# Patient Record
Sex: MALE | Race: WHITE | NOT HISPANIC OR LATINO | Employment: UNEMPLOYED | URBAN - METROPOLITAN AREA
[De-identification: names, ages, dates, MRNs, and addresses within clinical notes are randomized per-mention and may not be internally consistent; named-entity substitution may affect disease eponyms.]

---

## 2021-01-30 ENCOUNTER — HOSPITAL ENCOUNTER (EMERGENCY)
Facility: MEDICAL CENTER | Age: 31
End: 2021-01-30
Attending: EMERGENCY MEDICINE | Admitting: EMERGENCY MEDICINE
Payer: MEDICAID

## 2021-01-30 VITALS
WEIGHT: 167.99 LBS | BODY MASS INDEX: 22.75 KG/M2 | TEMPERATURE: 98.7 F | SYSTOLIC BLOOD PRESSURE: 142 MMHG | RESPIRATION RATE: 18 BRPM | HEIGHT: 72 IN | HEART RATE: 89 BPM | OXYGEN SATURATION: 96 % | DIASTOLIC BLOOD PRESSURE: 96 MMHG

## 2021-01-30 DIAGNOSIS — L08.9 STAPH SKIN INFECTION: ICD-10-CM

## 2021-01-30 DIAGNOSIS — B95.8 STAPH SKIN INFECTION: ICD-10-CM

## 2021-01-30 PROCEDURE — 700102 HCHG RX REV CODE 250 W/ 637 OVERRIDE(OP): Performed by: EMERGENCY MEDICINE

## 2021-01-30 PROCEDURE — A9270 NON-COVERED ITEM OR SERVICE: HCPCS | Performed by: EMERGENCY MEDICINE

## 2021-01-30 PROCEDURE — 99283 EMERGENCY DEPT VISIT LOW MDM: CPT

## 2021-01-30 RX ORDER — SULFAMETHOXAZOLE AND TRIMETHOPRIM 800; 160 MG/1; MG/1
2 TABLET ORAL ONCE
Status: COMPLETED | OUTPATIENT
Start: 2021-01-30 | End: 2021-01-30

## 2021-01-30 RX ORDER — SULFAMETHOXAZOLE AND TRIMETHOPRIM 800; 160 MG/1; MG/1
1 TABLET ORAL 2 TIMES DAILY
Qty: 14 TAB | Refills: 0 | Status: SHIPPED | OUTPATIENT
Start: 2021-01-30 | End: 2021-02-06

## 2021-01-30 RX ADMIN — SULFAMETHOXAZOLE AND TRIMETHOPRIM 2 TABLET: 800; 160 TABLET ORAL at 21:55

## 2021-01-30 ASSESSMENT — PAIN DESCRIPTION - PAIN TYPE: TYPE: ACUTE PAIN

## 2021-01-31 NOTE — ED PROVIDER NOTES
ED Provider Note    ED Provider Note    Scribed for Elena Montanez MD by Elena Montanez M.D.. 1/30/2021, 9:47 PM.    Primary care provider: Pcp Pt States None  Means of arrival: Private  History obtained from: Patient  History limited by: None    CHIEF COMPLAINT  Chief Complaint   Patient presents with   • Wound Infection     multiple wound infections  started about 2-3 weeks ago   has tried to take care of them however not working        HPI  Rudy Watson is a 30 y.o. male who presents to the Emergency Department for evaluation for skin lesions.  Patient tells me for approximate last 2 to 3 weeks he has noticed lesions isolated to the lower abdomen, the shaft of the penis, and the skin of the right knee.  No preceding injury as far as he is aware.  He is never really had any similar such symptoms.  Given lesions on his penis he was evaluated in the outpatient setting for sexually transmitted infections, he tells me he was also tested for syphilis.  Patient was positive for trichomonas and started on appropriate management for this though his lesions persist.  He notes no itching, albeit the lesion to his lower abdomen is right over the belt line and will be uncomfortable when he is wearing his clothes.  He notes yellow drainage, he is concerned for potential staph infection; no known history of MRSA.  Denies any fever, no nausea, no fatigue, no other particular ill symptoms.    REVIEW OF SYSTEMS  Pertinent positives include skin lesions as noted above. Pertinent negatives include no history of IV drug abuse, no fever, no vomiting.      PAST MEDICAL HISTORY   has a past medical history of ADHD (attention deficit hyperactivity disorder).    SURGICAL HISTORY  patient denies any surgical history    SOCIAL HISTORY  Social History     Tobacco Use   • Smoking status: Never Smoker   • Smokeless tobacco: Never Used   Substance Use Topics   • Alcohol use: No   • Drug use: Yes     Types: Oral     Comment: rare  THC      Social History     Substance and Sexual Activity   Drug Use Yes   • Types: Oral    Comment: rare THC         CURRENT MEDICATIONS  Home Medications    **Home medications have not yet been reviewed for this encounter**         ALLERGIES  No Known Allergies    PHYSICAL EXAM  VITAL SIGNS: /96   Pulse 89   Temp 37.1 °C (98.7 °F) (Temporal)   Resp 18   Ht 1.829 m (6')   Wt 76.2 kg (167 lb 15.9 oz)   SpO2 96%   BMI 22.78 kg/m²     General: Alert, no acute distress  Skin: Warm, dry, normal for ethnicity.  Erythematous lesions to anterior lower abdomen, penile shaft, and anterior right knee approximately 1 to 2-1/2 cm in diameter, raised, indurated, dry and rogel exudate.  No ulcerations, no chancre nor lymphadenopathy on  exam.  Head: Normocephalic, atraumatic  Cardiovascular: , Normal peripheral perfusion  Respiratory: respirations are non-labored  Musculoskeletal: No swelling, no deformity  Neurological: Alert and oriented to person, place, time, and situation  Lymphatics: No lymphadenopathy  Psychiatric: Cooperative, mildly anxious, otherwise appropriate mood & affect          COURSE & MEDICAL DECISION MAKING  Pertinent Labs & Imaging studies reviewed. (See chart for details)    9:47 PM - Patient seen and examined at bedside. Patient will be treated with Bactrim and nonadhesive dressings.  The differential diagnoses include but are not limited to: MRSA, MSSA, cellulitis    Patient Vitals for the past 24 hrs:   BP Temp Temp src Pulse Resp SpO2 Height Weight   01/30/21 2139 -- 37.1 °C (98.7 °F) Temporal -- -- 96 % -- --   01/30/21 2116 142/96 37.3 °C (99.1 °F) Temporal 89 18 94 % -- --   01/30/21 2115 -- -- -- -- -- -- 1.829 m (6') 76.2 kg (167 lb 15.9 oz)         Decision Making:  This is a 30 y.o. year old male who presents with atraumatic skin lesions.  On exam this is consistent with infectious process, there is dried yellow exudate highly suspicious for staphylococcal etiology.  Patient tells  me he was tested for multiple STDs including syphilis and tells me these were negative.  As such I think is reasonable to cover with Bactrim given the clinical presentation.  No fever, no tachycardia, hypotension, no evidence of septicemia.    The patient will return for new or worsening symptoms and is stable at the time of discharge.    Patient has had high blood pressure while in the emergency department, felt likely secondary to medical condition. Counseled patient to monitor blood pressure at home and follow up with primary care physician.     DISPOSITION:  Patient will be discharged home in stable condition.    FOLLOW UP:  Chriss Ferraro M.D.  [address] [place] [id number]  [phone]    Schedule an appointment as soon as possible for a visit         OUTPATIENT MEDICATIONS:  New Prescriptions    SULFAMETHOXAZOLE-TRIMETHOPRIM (BACTRIM DS) 800-160 MG TABLET    Take 1 Tab by mouth 2 times a day for 7 days.         FINAL IMPRESSION  1. Staph skin infection          Elena CARRANZA M.D. (Marina), am scribing for, and in the presence of, Elena Montanez MD.    Electronically signed by: Elena Montanez M.D. ([place]rib[place]), 1/30/2021    Elena CARRANZA MD personally performed the services described in this documentation, as scribed by Elena Montanez M.D. in my presence, and it is both accurate and complete    The note accurately reflects work and decisions made by me.  Elena Montanez M.D.  1/30/2021  9:54 PM

## 2021-01-31 NOTE — ED TRIAGE NOTES
Pt comes in c/o multiple infected area's on his lower abdomen and penis and one on his R knee  Has been on going the last 2-3 weeks  Has tried to take care of this at home however it is not working  Believes it is strep infection